# Patient Record
Sex: FEMALE | Race: WHITE | NOT HISPANIC OR LATINO | Employment: OTHER | URBAN - METROPOLITAN AREA
[De-identification: names, ages, dates, MRNs, and addresses within clinical notes are randomized per-mention and may not be internally consistent; named-entity substitution may affect disease eponyms.]

---

## 2024-11-18 ENCOUNTER — PRE-ADMISSION TESTING (OUTPATIENT)
Dept: PREADMISSION TESTING | Facility: HOSPITAL | Age: 69
End: 2024-11-18
Payer: MEDICARE

## 2024-11-18 VITALS
OXYGEN SATURATION: 99 % | HEIGHT: 68 IN | SYSTOLIC BLOOD PRESSURE: 167 MMHG | DIASTOLIC BLOOD PRESSURE: 77 MMHG | RESPIRATION RATE: 18 BRPM | HEART RATE: 56 BPM | WEIGHT: 152 LBS | TEMPERATURE: 97.9 F | BODY MASS INDEX: 23.04 KG/M2

## 2024-11-18 LAB
ANION GAP SERPL CALCULATED.3IONS-SCNC: 10 MMOL/L (ref 5–15)
BUN SERPL-MCNC: 7 MG/DL (ref 8–23)
BUN/CREAT SERPL: 9.9 (ref 7–25)
CALCIUM SPEC-SCNC: 9.3 MG/DL (ref 8.6–10.5)
CHLORIDE SERPL-SCNC: 101 MMOL/L (ref 98–107)
CO2 SERPL-SCNC: 28 MMOL/L (ref 22–29)
CREAT SERPL-MCNC: 0.71 MG/DL (ref 0.57–1)
DEPRECATED RDW RBC AUTO: 43.1 FL (ref 37–54)
EGFRCR SERPLBLD CKD-EPI 2021: 91.6 ML/MIN/1.73
ERYTHROCYTE [DISTWIDTH] IN BLOOD BY AUTOMATED COUNT: 11.8 % (ref 12.3–15.4)
GLUCOSE SERPL-MCNC: 97 MG/DL (ref 65–99)
HCT VFR BLD AUTO: 34.2 % (ref 34–46.6)
HGB BLD-MCNC: 11.6 G/DL (ref 12–15.9)
MCH RBC QN AUTO: 33.9 PG (ref 26.6–33)
MCHC RBC AUTO-ENTMCNC: 33.9 G/DL (ref 31.5–35.7)
MCV RBC AUTO: 100 FL (ref 79–97)
PLATELET # BLD AUTO: 258 10*3/MM3 (ref 140–450)
PMV BLD AUTO: 10.3 FL (ref 6–12)
POTASSIUM SERPL-SCNC: 3.4 MMOL/L (ref 3.5–5.2)
QT INTERVAL: 450 MS
QTC INTERVAL: 446 MS
RBC # BLD AUTO: 3.42 10*6/MM3 (ref 3.77–5.28)
SODIUM SERPL-SCNC: 139 MMOL/L (ref 136–145)
WBC NRBC COR # BLD AUTO: 5.03 10*3/MM3 (ref 3.4–10.8)

## 2024-11-18 PROCEDURE — 36415 COLL VENOUS BLD VENIPUNCTURE: CPT

## 2024-11-18 PROCEDURE — 93005 ELECTROCARDIOGRAM TRACING: CPT

## 2024-11-18 PROCEDURE — 80048 BASIC METABOLIC PNL TOTAL CA: CPT

## 2024-11-18 PROCEDURE — 85027 COMPLETE CBC AUTOMATED: CPT

## 2024-11-18 RX ORDER — SCOLOPAMINE TRANSDERMAL SYSTEM 1 MG/1
1 PATCH, EXTENDED RELEASE TRANSDERMAL AS NEEDED
COMMUNITY
Start: 2024-11-05 | End: 2024-11-18

## 2024-11-18 RX ORDER — MIRABEGRON 50 MG/1
50 TABLET, FILM COATED, EXTENDED RELEASE ORAL DAILY
COMMUNITY

## 2024-11-18 RX ORDER — PANTOPRAZOLE SODIUM 40 MG/1
40 TABLET, DELAYED RELEASE ORAL DAILY
COMMUNITY
Start: 2024-10-28 | End: 2025-04-27

## 2024-11-18 RX ORDER — FERROUS SULFATE 325(65) MG
325 TABLET ORAL DAILY
COMMUNITY
Start: 2024-09-06 | End: 2024-12-06

## 2024-11-18 RX ORDER — ACETAMINOPHEN 325 MG/1
650 TABLET ORAL EVERY 6 HOURS PRN
COMMUNITY

## 2024-11-18 RX ORDER — AMOXICILLIN 500 MG/1
500 CAPSULE ORAL AS NEEDED
COMMUNITY
Start: 2024-10-02

## 2024-11-18 RX ORDER — BUSPIRONE HYDROCHLORIDE 5 MG/1
5 TABLET ORAL 2 TIMES DAILY
COMMUNITY
Start: 2024-10-09 | End: 2025-01-08

## 2024-11-18 RX ORDER — IBUPROFEN 200 MG
200 TABLET ORAL EVERY 6 HOURS PRN
COMMUNITY

## 2024-11-18 RX ORDER — PROPYLENE GLYCOL 0.06 MG/ML
1 SOLUTION/ DROPS OPHTHALMIC DAILY
COMMUNITY

## 2024-11-18 RX ORDER — LORATADINE 10 MG/1
10 TABLET ORAL DAILY
COMMUNITY

## 2024-11-18 RX ORDER — ESTRADIOL 0.1 MG/G
0.5 CREAM VAGINAL 3 TIMES WEEKLY
COMMUNITY
Start: 2024-09-30

## 2024-11-18 NOTE — DISCHARGE INSTRUCTIONS
Take the following medications the morning of surgery:  PANTOPRAZOLE, BUSPIRONE    If you are on prescription narcotic pain medication to control your pain you may also take that medication the morning of surgery.      General Instructions:     Do not eat solid food after midnight the night before surgery.  Clear liquids day of surgery are allowed but must be stopped at least two hours before your hospital arrival time.       Allowed clear liquids      Water, sodas, and tea or coffee with no cream or milk added.       12 to 20 ounces of a clear liquid that contains carbohydrates is recommended.  If non-diabetic, have Gatorade or Powerade.  If diabetic, have G2 or Powerade Zero.     Do not have liquids red in color.  Do not consume chicken, beef, pork or vegetable broth or bouillon cubes of any variety as they are not considered clear liquids and are not allowed.      Infants may have breast milk up to four hours before surgery.  Infants drinking formula may drink formula up to six hours before surgery.   Patients who avoid smoking, chewing tobacco and alcohol for 4 weeks prior to surgery have a reduced risk of post-operative complications.  Quit smoking as many days before surgery as you can.  Do not smoke, use chewing tobacco or drink alcohol the day of surgery.   If applicable bring your C-PAP/ BI-PAP machine in with you to preop day of surgery.  Bring any papers given to you in the doctor’s office.  Wear clean comfortable clothes.  Do not wear contact lenses, false eyelashes or make-up.  Bring a case for your glasses.   Bring crutches or walker if applicable.  Remove all piercings.  Leave jewelry and any other valuables at home.  Hair extensions with metal clips must be removed prior to surgery.  The Pre-Admission Testing nurse will instruct you to bring medications if unable to obtain an accurate list in Pre-Admission Testing.        If you were given a blood bank ID arm band remember to bring it with you the  day of surgery.    Preventing a Surgical Site Infection:  For 2 to 3 days before surgery, avoid shaving with a razor because the razor can irritate skin and make it easier to develop an infection.    Any areas of open skin can increase the risk of a post-operative wound infection by allowing bacteria to enter and travel throughout the body.  Notify your surgeon if you have any skin wounds / rashes even if it is not near the expected surgical site.  The area will need assessed to determine if surgery should be delayed until it is healed.  The night prior to surgery shower using a fresh bar of anti-bacterial soap (such as Dial) and clean washcloth.  Sleep in a clean bed with clean clothing.  Do not allow pets to sleep with you.  Shower on the morning of surgery using a fresh bar of anti-bacterial soap (such as Dial) and clean washcloth.  Dry with a clean towel and dress in clean clothing.  Ask your surgeon if you will be receiving antibiotics prior to surgery.  Make sure you, your family, and all healthcare providers clean their hands with soap and water or an alcohol based hand  before caring for you or your wound.    Day of surgery:  Your arrival time is approximately two hours before your scheduled surgery time.  Please note if you have an early arrival time the surgery doors do not open before 5:00 AM.  Upon arrival, a Pre-op nurse and Anesthesiologist will review your health history, obtain vital signs, and answer questions you may have.  The only belongings needed at this time will be a list of your home medications and if applicable your C-PAP/BI-PAP machine.  A Pre-op nurse will start an IV and you may receive medication in preparation for surgery, including something to help you relax.     Please be aware that surgery does come with discomfort.  We want to make every effort to control your discomfort so please discuss any uncontrolled symptoms with your nurse.   Your doctor will most likely have  prescribed pain medications.      If you are going home after surgery you will receive individualized written care instructions before being discharged.  A responsible adult must drive you to and from the hospital on the day of your surgery and ideally stay with you through the night.   .  Discharge prescriptions can be filled by the hospital pharmacy during regular pharmacy hours.  If you are having surgery late in the day/evening your prescription may be e-prescribed to your pharmacy.  Please verify your pharmacy hours or chose a 24 hour pharmacy to avoid not having access to your prescription because your pharmacy has closed for the day.    If you are staying overnight following surgery, you will be transported to your hospital room following the recovery period.  [facility] has all private rooms.    If you have any questions please call Pre-Admission Testing at [phone].  Deductibles and co-payments are collected on the day of service. Please be prepared to pay the required co-pay, deductible or deposit on the day of service as defined by your plan.    Call your surgeon immediately if you experience any of the following symptoms:  Sore Throat  Shortness of Breath or difficulty breathing  Cough  Chills  Body soreness or muscle pain  Headache  Fever  New loss of taste or smell  Do not arrive for your surgery ill.  Your procedure will need to be rescheduled to another time.  You will need to call your physician before the day of surgery to avoid any unnecessary exposure to hospital staff as well as other patients.

## 2024-11-25 NOTE — H&P
"[facility]   HISTORY AND PHYSICAL    Patient Name:Tracie Mcintyre  : 1955  MRN: [id number]  Primary Care Physician: Lorenzo Hurtado PA-C  Date of admission: (Not on file)    Subjective   Subjective     Chief Complaint: here for surgery     History of Present Illness  Tracie Mcintyre is a 69 y.o.  female with Stage 2 utreovaginal prolapse here for surgery.  She is scheduled for a total robotic hysterectomy, BSO  sacrocolpopexy, possible anterior/posterior colporrhaphy,retropubic synthetic midurethral sling, and cystoscopy today at [facility].      Medical history is significant for GERD, HTN, Constipation, Anxiety.   Surgical history is significant for  Section, Knee Surgery.      Changes to health history since last visit: she is reporting conitnued leakage with laugh, cough and sneeze and is interested in proceeding with retropubic sling placement during procedure.       Pap history: approximate date  and was normal  Imaging: \"none\"   EMB: \" n/a\"   Other Clearance: \" n/a\"            Review of Systems   Constitutional:  Negative for chills and fever.   Respiratory:  Negative for cough, chest tightness and shortness of breath.    Genitourinary:  Negative for dysuria.   All other systems reviewed and are negative.        Personal History     Past Medical History:   Diagnosis Date    Anemia     Anxiety     Constipation     GERD (gastroesophageal reflux disease)     Heart murmur     PONV (postoperative nausea and vomiting)     Urine frequency     Uterovaginal prolapse        Past Surgical History:   Procedure Laterality Date     SECTION      LAPAROSCOPIC TUBAL LIGATION      SHOULDER MANIPULATION Left     TOTAL KNEE ARTHROPLASTY Right        Family History: Her family history is not on file.     Social History: She  reports that she has never smoked. She has never used smokeless tobacco. She reports that she does not currently use alcohol. She reports that she does not use drugs.    Home " Medications:  Mirabegron ER, Propylene Glycol, acetaminophen, amoxicillin, busPIRone, estradiol, ferrous sulfate, ibuprofen, loratadine, and pantoprazole    Allergies:  She has No Known Allergies.    Objective    Objective     Vitals:         Physical Exam     General:  Patient is a healthy appearing female who is well developed, well nourished in no acute distress.    Eyes:  Pupils equal, round. Sclera anicteric.   HEENT:  Normocephalic, atraumatic.    Chest:  Lungs clear to auscultation bilaterally. Normal respiratory effort.   Heart: Regular rate and rhythm. Normal peripheral vascular exam.   Abdomen:  Soft and non-tender, no masses, no hernia.   Skin: No rashes or lesions.   Musculoskeletal:  Normal strength. No edema.   Neurologic:  Alert, nonfocal.   Psychiatric:  Normal affect and mood, oriented x 3.      Genitourinary Exam:    External Genitalia:  Normal appearance, no lesions.     Urethral Meatus: normal size and location, no lesions or prolapse.   Urethra:  No masses or tenderness.   Bladder:  No masses or tenderness.   Vagina: bilateral upper vaginal erosions and granulation tissue bleeding after pessary removal.   Cervix: small, atrophic cervix flush with the upper vagina, pinpoint os.  Uterus: mobile, nontender, very small .   Adnexa/Parametrial:  No palpable masses, no tenderness.    Perineal Sensation: normal.   Anal [place]: absent.         POPQ Examination: (10/21/2024)      Aa  -2     Ba  -2    C  -4  ------------------------------------   GH  3.5    PB  4.5    TVL 8   ------------------------------------   Ap  -1     Bp  -1    D  -2      POP-Q Stage 2     RANDY was demonstrated 20 minutes after spontaneous void with cough/valsalva      Uroflow  Voided Volume: 500 ml   PVR: 111 ml by bladder scan         Assessment & Plan   Assessment / Plan     ASSESSMENT:   Tracie Mcintyre is a 69 y.o.  female with POP-Q Stage 2 uterovaginal prolapse and stress predominant COLE.      PLAN:   Patient scheduled  for total robotic hysterectomy, bilateral salpingoophorectomy, sacrocolpopexy, possible anterior colporrhaphy, possible posterior colporrhaphy, retropubic synthetic midurethral sling, and cystoscopy.   -Today she was allowed the opportunity to ask and discuss all of her questions and concerns related to before, during, and after surgery.  -Post operative expectations and restrictions reviewed in detail. Handout provided to patient to take home for reference.  -We discussed surgical risks, which include, but are not limited to, pain, bleeding, infection, injury to nearby organs including bowel, bladder, ureters, urethra, nerves and blood vessels, as well as the possibility of persistent or recurrent symptoms which may require future surgery.   -We discussed additional surgical risks of possible large abdominal incision, prolonged hospitalization, prolonged catheterization, deep vein thrombosis, and death.  -We further discussed the risk of postoperative dyspareunia or pelvic pain, and the potential for postoperative voiding dysfunction. Specifically, we discussed the risk of persistent or recurrent postoperative incontinence, or alternately postoperative urinary retention, either of which may require future surgical correction. Some pelvic surgeries have a risk of severe postoperative pelvic pain that can last several weeks for which may require removal of the suture or undoing of the surgery.   -Discussed the possibility of need for quinonez catheter post operatively if the patient is unable to void completely after surgery.   -If post operative voiding dysfunction after sling does not improve she may need urethrolysis or possible take down of the sling.  -With vaginal surgery scarring, narrowing, or shortening of the vagina may occur.  -With respect to permanent polypropylene mesh placement, we reviewed the recent FDA communications and the ~4% risk of a mesh-related complication related to this procedure, which could  include mesh erosion into the vagina, bladder, urethra, ureters, or bowel, or alternately pelvic pain or dyspareunia, which may require future surgery. Surgery for any complications may or may not correct the complication. The patient was amenable to these risks, and wishes to proceed.   -Patient has been given a copy of mesh information from the .  -Bowel function is unpredictable postoperatively; surgery may help, not change, or even worsen any present symptoms. In the setting of prior constipation, we recommend starting a bowel regimen prior to surgery.  -FPMRS post operative instructions/hand out provided to patient.  -Consents were signed. She is to meet with PAT for anesthesia counseling and consent.       Mixed Urinary incontinence  - Pt reporting stress urinary incontinence and we discussed worsening RANDY post operatively.   - Data shows 25% risk of RANDY after sacrocolpopexy procedure   - Due to increased risk of worsening RANDY after procedure and inability to present to office for UDS prior to procedure, would recommend retropubic sling placement at time of procedure.       Pre-Op Abx: Ancef 2g  VTE ppx: SCDs  Disposition: Discharge home following surgery, voiding trial in PACU         Kaylah Chung MD  Attending: Dr. Cristopher Chavez MD   I have reviewed the attached history and physical and there are no updates or changes to history and or physical.

## 2024-11-25 NOTE — DISCHARGE INSTRUCTIONS
Female Pelvic Medicine & Reconstructive Surgery (Urogynecology)     POST OPERATIVE INSTRUCTIONS & RECOVERY    You may have had reconstructive pelvic surgery and it is now your turn to play an important role in its success. We know that it will take about 12 weeks for the tissues that have been operated on to heal to 80% of their final strength. Then it may take months or even up to two years for this healing to be complete. Major risk factors for tearing down the surgical repair in the first weeks or months are constipation and heavy lifting. Our recommendations below are to try to prevent this from happening.  The staff at [facility] is committed to ensuring that your post-operative experience is as comfortable as possible. Please do not hesitate to call the office for any questions after recovery. Any questions regarding your surgery or post-operative recovery should be directed to the staff at [facility] rather than your Primary Care Physician (PCP). The following information will help answer the frequently asked questions and will help you understand some of the common experiences that may occur after your surgery.    What should I expect immediately after surgery?    Activity  Take it easy for the first few weeks after surgery; you may need assistance the first few days. Some people notice slight depression after surgery. This will resolve on its own, but please call us if it does not.  You may walk as much as you would like and go up and down stairs, although in the beginning you may need to take one step at a time. Avoid jogging, aerobics, swimming, and biking. Avoid heavy pushing or pulling, including vacuuming and lifting pets/children/grandchildren.  You can return to work 4 to 6 weeks after surgery depending on your requirements for lifting and standing.   Heavy lifting is a major risk factor for tearing down your repair. You should not lift anything heavier than a gallon of milk (8 to 9  pounds) for the first six weeks. You may gradually increase your lifting to 20 pounds six to twelve weeks after surgery.  You may drive your car as soon as you feel ready and can safely react/turn your body, but not while you are taking narcotic medication.    Nutrition  Resume your normal diet after surgery. Eat a well-balanced diet. Drink six to eight glasses of non-caffeinated beverages daily. Water is especially recommended.   Resume all of your prior medications once you are home unless otherwise instructed by your doctor.     Abdominal incision care  If you have an abdominal incision, clean the incision with water. Incisions for laparoscopy are usually ¼ to ½ inch in size. You may have 2 to 3 small incisions with one larger ¾ inch incision OR you may only have 3 to 4 small incisions.   The incisions will have absorbent sutures, steri-strips, band-aids and/or skin glue. The band-aids may be removed the day after surgery. The steri-strips can be kept on until they curl at the edges. The sutures will absorb by themselves. If your skin is closed with staples they must be removed 7-10 days after surgery. Call the office to schedule an appointment for this procedure.    Hygiene, Abdominal incision care, and Vaginal discharge  Shower as usual. If you have an abdominal incision, clean the incision with water. Do not bathe in the bathtub until you have been given permission, usually after you see your surgeon at your 6-week post-op appointment. Keep the incision area clean and dry. Do not use a dressing unless your incision is draining or irritated.  You may notice an abnormal discharge or drainage. It is normal for discharge to go from red to pink to yellow as your sutures dissolve. If the discharge is foul-smelling please call our office.  Keep the perineal area (between the vagina and the rectum) clean and dry. Clean after every bowel movement and each time you urinate. Rinsing with plain water or a sitz-bath may be  helpful.  Use maxi pads - not tampons - to absorb blood or discharge. Avoid douching. You may notice vaginal spotting for up to 6 weeks. If you have bleeding that is heavy enough to soak through two maxi pads for two hours in a row call our office. Keep track of when the bleeding began and how many pads you have used.    Bowel Movements  Constipation can cause severe pain that can get worse with increased amounts of medication. Narcotics can make you constipated. Although we recognize that, depending on the extent of your surgery you may need to use narcotics, please be aware that they can cause constipation.  Constipation is also a major risk factor in tearing down of the surgical repair. We want to avoid constipation. Preventing constipation is much easier than treating it once it happens.  Do not strain during bowel movements.  If you experience constipation, make sure you are drinking plenty of water and eating a high fiber diet. A high fiber diet includes fruits, vegetables, and bran. Your doctor may have also recommended a fiber supplement like Benefiber, Metamucil, Citrucel, or Fibercon. Take this as directed once you are home from the hospital.  If you commonly experience constipation,  over-the-counter MiraLAX (you can use a generic version of polyethylene glycol as well). Please take this daily. If your stool gets too loose while using MiraLAX you can decrease using the medication every other day or every third day. Adjust the MiraLAX dose according to what your need for a regular bowel movement without straining.  If you start to feel constipated you can take MiraLAX once in the morning and once at night until you have your first bowel movement. Alternatively, you may also use a mild laxative such as Milk of Magnesia or a stool softener such as Colace®. In addition, make sure that you are getting enough activity during the day. No prescription is required for these medications.     Voiding /  Urination  Empty your bladder every two to three hours while you are awake, whether you feel like you need to or not. Sometimes surgery can change your sensation of fullness and you may not feel like you need to void. We want to avoid your bladder becoming too full.  If you have any special bladder instructions, you will be given them in the hospital. If you are unsure about these instructions call the office.  Call the office if you begin to experience any bladder problems. These problems may include urgency, frequency or pain with urination.     Sexual Juda  You should not have intercourse for a minimum of 6 weeks after surgery. Your doctor will tell you when you can resume sexual intercourse at your post-op visit.  You should not put anything in the vagina for six weeks after surgery. The only exceptions are if your doctor told you to use vaginal estrogen cream or pills. If you are using vaginal estrogen cream or pills, you can resume using those when you get home.    Fever  If you feel feverish or having shaking chills, take your temperature.  If your temperature is 100.4°F twice in a row, or if your temperature is 101°F or higher one time, call our office. This may mean that you have an infection and we will want to know about that.    Pain control  Most women will require pain medication after surgery. If you are comfortable you will be a little more active, which will help recovery.  Follow your doctor's instructions regarding pain medication. Because the narcotics can cause constipation, we encourage you to take over the counter medications like Tylenol (generic name: acetaminophen) or ibuprofen when you are able. If you are unsure if you are able to take these medications then ask your doctor about using them before your use it. Make sure that you do not take more than the recommended daily limit.   You may use a heating pad on your abdomen to relieve gas pain.      Please notify our office if any of  the following occurs:  Increased pain, redness, discharge, hardness, or swelling at the incision.  Pain or burning with urination  Vaginal bleeding that soaks more than 1 maxi pad per hour for two hours in a row  Fever greater than 101°F   Persistent nausea, vomiting or inability to have bowel movements.  Shortness of breath, calf pain or swelling in your extremities    Please call the office to schedule your post-operative visit for about 6 weeks after your surgery. You may also see your doctor again about 3 to 6 months and/or 1 year after your surgery. Additional appointments can and will be made based upon need.     **If you leave the hospital with a bladder catheter in place call the office to schedule an appointment to take place 1 week after your surgery **    Please call with any questions or concerns.   [phone] Monday to Friday 8AM to 5PM  or [phone] after work hours and during holidays    Dr. Cristopher Tubbs

## 2024-11-26 ENCOUNTER — ANESTHESIA EVENT (OUTPATIENT)
Dept: PERIOP | Facility: HOSPITAL | Age: 69
End: 2024-11-26
Payer: MEDICARE

## 2024-11-26 ENCOUNTER — HOSPITAL ENCOUNTER (OUTPATIENT)
Facility: HOSPITAL | Age: 69
Discharge: HOME | End: 2024-11-27
Attending: OBSTETRICS & GYNECOLOGY | Admitting: OBSTETRICS & GYNECOLOGY
Payer: MEDICARE

## 2024-11-26 ENCOUNTER — ANESTHESIA (OUTPATIENT)
Dept: PERIOP | Facility: HOSPITAL | Age: 69
End: 2024-11-26
Payer: MEDICARE

## 2024-11-26 DIAGNOSIS — G89.18 ACUTE POSTOPERATIVE PAIN: Primary | ICD-10-CM

## 2024-11-26 DIAGNOSIS — N81.4 UTEROVAGINAL PROLAPSE: ICD-10-CM

## 2024-11-26 LAB
ABO GROUP BLD: NORMAL
BLD GP AB SCN SERPL QL: NEGATIVE
RH BLD: POSITIVE
T&S EXPIRATION DATE: NORMAL

## 2024-11-26 PROCEDURE — 25010000002 DEXAMETHASONE SODIUM PHOSPHATE 20 MG/5ML SOLUTION

## 2024-11-26 PROCEDURE — 25810000003 LACTATED RINGERS PER 1000 ML: Performed by: STUDENT IN AN ORGANIZED HEALTH CARE EDUCATION/TRAINING PROGRAM

## 2024-11-26 PROCEDURE — 25010000002 LIDOCAINE 2% SOLUTION

## 2024-11-26 PROCEDURE — 25010000002 ACETAMINOPHEN 10 MG/ML SOLUTION

## 2024-11-26 PROCEDURE — 25010000002 PROPOFOL 10 MG/ML EMULSION

## 2024-11-26 PROCEDURE — 25010000002 CEFAZOLIN PER 500 MG: Performed by: STUDENT IN AN ORGANIZED HEALTH CARE EDUCATION/TRAINING PROGRAM

## 2024-11-26 PROCEDURE — 25810000003 SODIUM CHLORIDE PER 500 ML: Performed by: OBSTETRICS & GYNECOLOGY

## 2024-11-26 PROCEDURE — 88305 TISSUE EXAM BY PATHOLOGIST: CPT | Performed by: OBSTETRICS & GYNECOLOGY

## 2024-11-26 PROCEDURE — 86901 BLOOD TYPING SEROLOGIC RH(D): CPT | Performed by: STUDENT IN AN ORGANIZED HEALTH CARE EDUCATION/TRAINING PROGRAM

## 2024-11-26 PROCEDURE — 25010000002 SUGAMMADEX 200 MG/2ML SOLUTION

## 2024-11-26 PROCEDURE — 86900 BLOOD TYPING SEROLOGIC ABO: CPT | Performed by: STUDENT IN AN ORGANIZED HEALTH CARE EDUCATION/TRAINING PROGRAM

## 2024-11-26 PROCEDURE — G0378 HOSPITAL OBSERVATION PER HR: HCPCS

## 2024-11-26 PROCEDURE — 25010000002 ONDANSETRON PER 1 MG

## 2024-11-26 PROCEDURE — 63710000001 BUSPIRONE 5 MG TABLET: Performed by: STUDENT IN AN ORGANIZED HEALTH CARE EDUCATION/TRAINING PROGRAM

## 2024-11-26 PROCEDURE — 25010000002 KETOROLAC TROMETHAMINE PER 15 MG

## 2024-11-26 PROCEDURE — C1763 CONN TISS, NON-HUMAN: HCPCS | Performed by: OBSTETRICS & GYNECOLOGY

## 2024-11-26 PROCEDURE — 63710000001 ACETAMINOPHEN EXTRA STRENGTH 500 MG TABLET: Performed by: STUDENT IN AN ORGANIZED HEALTH CARE EDUCATION/TRAINING PROGRAM

## 2024-11-26 PROCEDURE — 25010000002 GLYCOPYRROLATE 0.2 MG/ML SOLUTION

## 2024-11-26 PROCEDURE — 86850 RBC ANTIBODY SCREEN: CPT | Performed by: STUDENT IN AN ORGANIZED HEALTH CARE EDUCATION/TRAINING PROGRAM

## 2024-11-26 PROCEDURE — 25010000002 KETOROLAC TROMETHAMINE PER 15 MG: Performed by: STUDENT IN AN ORGANIZED HEALTH CARE EDUCATION/TRAINING PROGRAM

## 2024-11-26 PROCEDURE — 25010000002 HYDROMORPHONE PER 4 MG: Performed by: NURSE ANESTHETIST, CERTIFIED REGISTERED

## 2024-11-26 PROCEDURE — 25010000002 MAGNESIUM SULFATE PER 500 MG OF MAGNESIUM

## 2024-11-26 PROCEDURE — 25010000002 HYDROMORPHONE 1 MG/ML SOLUTION

## 2024-11-26 PROCEDURE — 25010000002 CEFAZOLIN PER 500 MG

## 2024-11-26 PROCEDURE — 25010000002 FENTANYL CITRATE (PF) 50 MCG/ML SOLUTION

## 2024-11-26 PROCEDURE — A9270 NON-COVERED ITEM OR SERVICE: HCPCS | Performed by: STUDENT IN AN ORGANIZED HEALTH CARE EDUCATION/TRAINING PROGRAM

## 2024-11-26 PROCEDURE — C1771 REP DEV, URINARY, W/SLING: HCPCS | Performed by: OBSTETRICS & GYNECOLOGY

## 2024-11-26 DEVICE — KNOTLESS TISSUE CONTROL DEVICE, UNDYED UNIDIRECTIONAL (ANTIBACTERIAL) SYNTHETIC ABSORBABLE DEVICE
Type: IMPLANTABLE DEVICE | Site: VAGINA | Status: FUNCTIONAL
Brand: STRATAFIX

## 2024-11-26 DEVICE — TRANSVAGINAL MID-URETHRAL SLING
Type: IMPLANTABLE DEVICE | Site: VAGINA | Status: FUNCTIONAL
Brand: ADVANTAGE FIT™  SYSTEM

## 2024-11-26 DEVICE — TRADITIONAL Y MESH
Type: IMPLANTABLE DEVICE | Site: VAGINA | Status: FUNCTIONAL
Brand: UPSYLON™

## 2024-11-26 DEVICE — KNOTLESS TISSUE CONTROL DEVICE, VIOLET UNIDIRECTIONAL (ANTIBACTERIAL) SYNTHETIC ABSORBABLE DEVICE
Type: IMPLANTABLE DEVICE | Site: VAGINA | Status: FUNCTIONAL
Brand: STRATAFIX

## 2024-11-26 RX ORDER — OXYCODONE HYDROCHLORIDE 5 MG/1
5 TABLET ORAL EVERY 4 HOURS PRN
Status: DISCONTINUED | OUTPATIENT
Start: 2024-11-26 | End: 2024-11-27 | Stop reason: HOSPADM

## 2024-11-26 RX ORDER — LABETALOL HYDROCHLORIDE 5 MG/ML
5 INJECTION, SOLUTION INTRAVENOUS
Status: DISCONTINUED | OUTPATIENT
Start: 2024-11-26 | End: 2024-11-26 | Stop reason: HOSPADM

## 2024-11-26 RX ORDER — SCOLOPAMINE TRANSDERMAL SYSTEM 1 MG/1
1 PATCH, EXTENDED RELEASE TRANSDERMAL CONTINUOUS
Status: DISCONTINUED | OUTPATIENT
Start: 2024-11-26 | End: 2024-11-27 | Stop reason: HOSPADM

## 2024-11-26 RX ORDER — HYDROMORPHONE HYDROCHLORIDE 1 MG/ML
0.5 INJECTION, SOLUTION INTRAMUSCULAR; INTRAVENOUS; SUBCUTANEOUS
Status: DISCONTINUED | OUTPATIENT
Start: 2024-11-26 | End: 2024-11-26 | Stop reason: HOSPADM

## 2024-11-26 RX ORDER — DEXTROSE MONOHYDRATE 25 G/50ML
INJECTION, SOLUTION INTRAVENOUS AS NEEDED
Status: DISCONTINUED | OUTPATIENT
Start: 2024-11-26 | End: 2024-11-26 | Stop reason: HOSPADM

## 2024-11-26 RX ORDER — LIDOCAINE HYDROCHLORIDE 10 MG/ML
0.5 INJECTION, SOLUTION INFILTRATION; PERINEURAL ONCE AS NEEDED
Status: DISCONTINUED | OUTPATIENT
Start: 2024-11-26 | End: 2024-11-26 | Stop reason: HOSPADM

## 2024-11-26 RX ORDER — IPRATROPIUM BROMIDE AND ALBUTEROL SULFATE 2.5; .5 MG/3ML; MG/3ML
3 SOLUTION RESPIRATORY (INHALATION) ONCE AS NEEDED
Status: DISCONTINUED | OUTPATIENT
Start: 2024-11-26 | End: 2024-11-26 | Stop reason: HOSPADM

## 2024-11-26 RX ORDER — DROPERIDOL 2.5 MG/ML
0.62 INJECTION, SOLUTION INTRAMUSCULAR; INTRAVENOUS
Status: DISCONTINUED | OUTPATIENT
Start: 2024-11-26 | End: 2024-11-26 | Stop reason: HOSPADM

## 2024-11-26 RX ORDER — ROCURONIUM BROMIDE 10 MG/ML
INJECTION, SOLUTION INTRAVENOUS AS NEEDED
Status: DISCONTINUED | OUTPATIENT
Start: 2024-11-26 | End: 2024-11-26 | Stop reason: SURG

## 2024-11-26 RX ORDER — FENTANYL CITRATE 50 UG/ML
50 INJECTION, SOLUTION INTRAMUSCULAR; INTRAVENOUS
Status: DISCONTINUED | OUTPATIENT
Start: 2024-11-26 | End: 2024-11-26 | Stop reason: HOSPADM

## 2024-11-26 RX ORDER — KETOROLAC TROMETHAMINE 30 MG/ML
INJECTION, SOLUTION INTRAMUSCULAR; INTRAVENOUS AS NEEDED
Status: DISCONTINUED | OUTPATIENT
Start: 2024-11-26 | End: 2024-11-26 | Stop reason: SURG

## 2024-11-26 RX ORDER — SODIUM CHLORIDE, SODIUM LACTATE, POTASSIUM CHLORIDE, CALCIUM CHLORIDE 600; 310; 30; 20 MG/100ML; MG/100ML; MG/100ML; MG/100ML
125 INJECTION, SOLUTION INTRAVENOUS CONTINUOUS
Status: ACTIVE | OUTPATIENT
Start: 2024-11-26 | End: 2024-11-26

## 2024-11-26 RX ORDER — MIDAZOLAM HYDROCHLORIDE 1 MG/ML
0.5 INJECTION, SOLUTION INTRAMUSCULAR; INTRAVENOUS
Status: DISCONTINUED | OUTPATIENT
Start: 2024-11-26 | End: 2024-11-26 | Stop reason: HOSPADM

## 2024-11-26 RX ORDER — DEXAMETHASONE SODIUM PHOSPHATE 4 MG/ML
INJECTION, SOLUTION INTRA-ARTICULAR; INTRALESIONAL; INTRAMUSCULAR; INTRAVENOUS; SOFT TISSUE AS NEEDED
Status: DISCONTINUED | OUTPATIENT
Start: 2024-11-26 | End: 2024-11-26 | Stop reason: SURG

## 2024-11-26 RX ORDER — DIPHENHYDRAMINE HYDROCHLORIDE 50 MG/ML
12.5 INJECTION INTRAMUSCULAR; INTRAVENOUS
Status: DISCONTINUED | OUTPATIENT
Start: 2024-11-26 | End: 2024-11-26 | Stop reason: HOSPADM

## 2024-11-26 RX ORDER — HYDROCODONE BITARTRATE AND ACETAMINOPHEN 5; 325 MG/1; MG/1
1 TABLET ORAL ONCE AS NEEDED
Status: DISCONTINUED | OUTPATIENT
Start: 2024-11-26 | End: 2024-11-26 | Stop reason: HOSPADM

## 2024-11-26 RX ORDER — SODIUM CHLORIDE, SODIUM LACTATE, POTASSIUM CHLORIDE, CALCIUM CHLORIDE 600; 310; 30; 20 MG/100ML; MG/100ML; MG/100ML; MG/100ML
9 INJECTION, SOLUTION INTRAVENOUS CONTINUOUS
Status: ACTIVE | OUTPATIENT
Start: 2024-11-26 | End: 2024-11-27

## 2024-11-26 RX ORDER — ACETAMINOPHEN 500 MG
1000 TABLET ORAL ONCE
Status: COMPLETED | OUTPATIENT
Start: 2024-11-26 | End: 2024-11-26

## 2024-11-26 RX ORDER — PANTOPRAZOLE SODIUM 40 MG/1
40 TABLET, DELAYED RELEASE ORAL
Status: DISCONTINUED | OUTPATIENT
Start: 2024-11-27 | End: 2024-11-27 | Stop reason: HOSPADM

## 2024-11-26 RX ORDER — ONDANSETRON 2 MG/ML
4 INJECTION INTRAMUSCULAR; INTRAVENOUS EVERY 6 HOURS PRN
Status: DISCONTINUED | OUTPATIENT
Start: 2024-11-26 | End: 2024-11-27 | Stop reason: HOSPADM

## 2024-11-26 RX ORDER — SODIUM CHLORIDE 9 MG/ML
40 INJECTION, SOLUTION INTRAVENOUS AS NEEDED
Status: DISCONTINUED | OUTPATIENT
Start: 2024-11-26 | End: 2024-11-26 | Stop reason: HOSPADM

## 2024-11-26 RX ORDER — SODIUM CHLORIDE 0.9 % (FLUSH) 0.9 %
3 SYRINGE (ML) INJECTION EVERY 12 HOURS SCHEDULED
Status: DISCONTINUED | OUTPATIENT
Start: 2024-11-26 | End: 2024-11-26 | Stop reason: HOSPADM

## 2024-11-26 RX ORDER — NALOXONE HCL 0.4 MG/ML
0.2 VIAL (ML) INJECTION AS NEEDED
Status: DISCONTINUED | OUTPATIENT
Start: 2024-11-26 | End: 2024-11-26 | Stop reason: HOSPADM

## 2024-11-26 RX ORDER — SODIUM CHLORIDE 9 MG/ML
INJECTION, SOLUTION INTRAVENOUS AS NEEDED
Status: DISCONTINUED | OUTPATIENT
Start: 2024-11-26 | End: 2024-11-26 | Stop reason: HOSPADM

## 2024-11-26 RX ORDER — FLUMAZENIL 0.1 MG/ML
0.2 INJECTION INTRAVENOUS AS NEEDED
Status: DISCONTINUED | OUTPATIENT
Start: 2024-11-26 | End: 2024-11-26 | Stop reason: HOSPADM

## 2024-11-26 RX ORDER — ONDANSETRON 2 MG/ML
4 INJECTION INTRAMUSCULAR; INTRAVENOUS ONCE AS NEEDED
Status: DISCONTINUED | OUTPATIENT
Start: 2024-11-26 | End: 2024-11-26 | Stop reason: HOSPADM

## 2024-11-26 RX ORDER — MAGNESIUM SULFATE HEPTAHYDRATE 500 MG/ML
INJECTION, SOLUTION INTRAMUSCULAR; INTRAVENOUS AS NEEDED
Status: DISCONTINUED | OUTPATIENT
Start: 2024-11-26 | End: 2024-11-26 | Stop reason: SURG

## 2024-11-26 RX ORDER — BUSPIRONE HYDROCHLORIDE 5 MG/1
5 TABLET ORAL EVERY 12 HOURS SCHEDULED
Status: DISCONTINUED | OUTPATIENT
Start: 2024-11-26 | End: 2024-11-27 | Stop reason: HOSPADM

## 2024-11-26 RX ORDER — SODIUM CHLORIDE 0.9 % (FLUSH) 0.9 %
10 SYRINGE (ML) INJECTION AS NEEDED
Status: DISCONTINUED | OUTPATIENT
Start: 2024-11-26 | End: 2024-11-26 | Stop reason: HOSPADM

## 2024-11-26 RX ORDER — FENTANYL CITRATE 50 UG/ML
INJECTION, SOLUTION INTRAMUSCULAR; INTRAVENOUS AS NEEDED
Status: DISCONTINUED | OUTPATIENT
Start: 2024-11-26 | End: 2024-11-26 | Stop reason: SURG

## 2024-11-26 RX ORDER — CEFAZOLIN SODIUM 500 MG/2.2ML
INJECTION, POWDER, FOR SOLUTION INTRAMUSCULAR; INTRAVENOUS AS NEEDED
Status: DISCONTINUED | OUTPATIENT
Start: 2024-11-26 | End: 2024-11-26 | Stop reason: SURG

## 2024-11-26 RX ORDER — EPHEDRINE SULFATE 50 MG/ML
5 INJECTION, SOLUTION INTRAVENOUS ONCE AS NEEDED
Status: DISCONTINUED | OUTPATIENT
Start: 2024-11-26 | End: 2024-11-26 | Stop reason: HOSPADM

## 2024-11-26 RX ORDER — PROMETHAZINE HYDROCHLORIDE 25 MG/1
25 SUPPOSITORY RECTAL ONCE AS NEEDED
Status: DISCONTINUED | OUTPATIENT
Start: 2024-11-26 | End: 2024-11-26 | Stop reason: HOSPADM

## 2024-11-26 RX ORDER — OXYCODONE AND ACETAMINOPHEN 7.5; 325 MG/1; MG/1
1 TABLET ORAL EVERY 4 HOURS PRN
Status: DISCONTINUED | OUTPATIENT
Start: 2024-11-26 | End: 2024-11-26 | Stop reason: HOSPADM

## 2024-11-26 RX ORDER — SODIUM CHLORIDE 0.9 % (FLUSH) 0.9 %
10 SYRINGE (ML) INJECTION EVERY 12 HOURS SCHEDULED
Status: DISCONTINUED | OUTPATIENT
Start: 2024-11-26 | End: 2024-11-26 | Stop reason: HOSPADM

## 2024-11-26 RX ORDER — HYDRALAZINE HYDROCHLORIDE 20 MG/ML
5 INJECTION INTRAMUSCULAR; INTRAVENOUS
Status: DISCONTINUED | OUTPATIENT
Start: 2024-11-26 | End: 2024-11-26 | Stop reason: HOSPADM

## 2024-11-26 RX ORDER — LIDOCAINE HYDROCHLORIDE 20 MG/ML
INJECTION, SOLUTION INFILTRATION; PERINEURAL AS NEEDED
Status: DISCONTINUED | OUTPATIENT
Start: 2024-11-26 | End: 2024-11-26 | Stop reason: SURG

## 2024-11-26 RX ORDER — PROMETHAZINE HYDROCHLORIDE 25 MG/1
25 TABLET ORAL ONCE AS NEEDED
Status: DISCONTINUED | OUTPATIENT
Start: 2024-11-26 | End: 2024-11-26 | Stop reason: HOSPADM

## 2024-11-26 RX ORDER — FAMOTIDINE 10 MG/ML
20 INJECTION, SOLUTION INTRAVENOUS ONCE
Status: COMPLETED | OUTPATIENT
Start: 2024-11-26 | End: 2024-11-26

## 2024-11-26 RX ORDER — KETOROLAC TROMETHAMINE 15 MG/ML
15 INJECTION, SOLUTION INTRAMUSCULAR; INTRAVENOUS EVERY 6 HOURS
Status: DISCONTINUED | OUTPATIENT
Start: 2024-11-26 | End: 2024-11-27 | Stop reason: HOSPADM

## 2024-11-26 RX ORDER — ONDANSETRON 4 MG/1
4 TABLET, ORALLY DISINTEGRATING ORAL EVERY 6 HOURS PRN
Status: DISCONTINUED | OUTPATIENT
Start: 2024-11-26 | End: 2024-11-27 | Stop reason: HOSPADM

## 2024-11-26 RX ORDER — ACETAMINOPHEN 500 MG
1000 TABLET ORAL EVERY 8 HOURS
Status: DISCONTINUED | OUTPATIENT
Start: 2024-11-26 | End: 2024-11-27 | Stop reason: HOSPADM

## 2024-11-26 RX ORDER — PROPOFOL 10 MG/ML
VIAL (ML) INTRAVENOUS AS NEEDED
Status: DISCONTINUED | OUTPATIENT
Start: 2024-11-26 | End: 2024-11-26 | Stop reason: SURG

## 2024-11-26 RX ORDER — GLYCOPYRROLATE 0.2 MG/ML
INJECTION INTRAMUSCULAR; INTRAVENOUS AS NEEDED
Status: DISCONTINUED | OUTPATIENT
Start: 2024-11-26 | End: 2024-11-26 | Stop reason: SURG

## 2024-11-26 RX ORDER — ONDANSETRON 2 MG/ML
INJECTION INTRAMUSCULAR; INTRAVENOUS AS NEEDED
Status: DISCONTINUED | OUTPATIENT
Start: 2024-11-26 | End: 2024-11-26 | Stop reason: SURG

## 2024-11-26 RX ORDER — SODIUM CHLORIDE 0.9 % (FLUSH) 0.9 %
3-10 SYRINGE (ML) INJECTION AS NEEDED
Status: DISCONTINUED | OUTPATIENT
Start: 2024-11-26 | End: 2024-11-26 | Stop reason: HOSPADM

## 2024-11-26 RX ORDER — OXYCODONE HYDROCHLORIDE 5 MG/1
5 TABLET ORAL EVERY 6 HOURS PRN
Qty: 12 TABLET | Refills: 0 | Status: SHIPPED | OUTPATIENT
Start: 2024-11-26

## 2024-11-26 RX ORDER — ACETAMINOPHEN 10 MG/ML
INJECTION, SOLUTION INTRAVENOUS AS NEEDED
Status: DISCONTINUED | OUTPATIENT
Start: 2024-11-26 | End: 2024-11-26 | Stop reason: SURG

## 2024-11-26 RX ADMIN — ROCURONIUM BROMIDE 20 MG: 10 INJECTION, SOLUTION INTRAVENOUS at 13:33

## 2024-11-26 RX ADMIN — HYDROMORPHONE HYDROCHLORIDE 0.5 MG: 0.5 INJECTION, SOLUTION INTRAMUSCULAR; INTRAVENOUS; SUBCUTANEOUS at 20:11

## 2024-11-26 RX ADMIN — SODIUM CHLORIDE 2000 MG: 900 INJECTION INTRAVENOUS at 10:41

## 2024-11-26 RX ADMIN — FENTANYL CITRATE 50 MCG: 50 INJECTION, SOLUTION INTRAMUSCULAR; INTRAVENOUS at 12:49

## 2024-11-26 RX ADMIN — KETOROLAC TROMETHAMINE 30 MG: 30 INJECTION, SOLUTION INTRAMUSCULAR at 16:16

## 2024-11-26 RX ADMIN — ROCURONIUM BROMIDE 50 MG: 10 INJECTION, SOLUTION INTRAVENOUS at 10:55

## 2024-11-26 RX ADMIN — FENTANYL CITRATE 50 MCG: 50 INJECTION, SOLUTION INTRAMUSCULAR; INTRAVENOUS at 11:27

## 2024-11-26 RX ADMIN — KETOROLAC TROMETHAMINE 15 MG: 15 INJECTION, SOLUTION INTRAMUSCULAR; INTRAVENOUS at 21:35

## 2024-11-26 RX ADMIN — MAGNESIUM SULFATE HEPTAHYDRATE 2 G: 500 INJECTION, SOLUTION INTRAMUSCULAR; INTRAVENOUS at 11:16

## 2024-11-26 RX ADMIN — CEFAZOLIN 2 G: 225 INJECTION, POWDER, FOR SOLUTION INTRAMUSCULAR; INTRAVENOUS at 14:41

## 2024-11-26 RX ADMIN — GLYCOPYRROLATE 0.2 MG: 0.2 INJECTION INTRAMUSCULAR; INTRAVENOUS at 11:52

## 2024-11-26 RX ADMIN — ROCURONIUM BROMIDE 10 MG: 10 INJECTION, SOLUTION INTRAVENOUS at 15:16

## 2024-11-26 RX ADMIN — SODIUM CHLORIDE, SODIUM LACTATE, POTASSIUM CHLORIDE, CALCIUM CHLORIDE 9 ML/HR: 20; 30; 600; 310 INJECTION, SOLUTION INTRAVENOUS at 09:46

## 2024-11-26 RX ADMIN — BUSPIRONE HYDROCHLORIDE 5 MG: 5 TABLET ORAL at 21:34

## 2024-11-26 RX ADMIN — FAMOTIDINE 20 MG: 10 INJECTION INTRAVENOUS at 09:47

## 2024-11-26 RX ADMIN — ROCURONIUM BROMIDE 10 MG: 10 INJECTION, SOLUTION INTRAVENOUS at 14:30

## 2024-11-26 RX ADMIN — ROCURONIUM BROMIDE 20 MG: 10 INJECTION, SOLUTION INTRAVENOUS at 11:20

## 2024-11-26 RX ADMIN — SCOPOLAMINE 1 PATCH: 1.5 PATCH, EXTENDED RELEASE TRANSDERMAL at 09:47

## 2024-11-26 RX ADMIN — ACETAMINOPHEN 1000 MG: 500 TABLET ORAL at 23:59

## 2024-11-26 RX ADMIN — SUGAMMADEX 200 MG: 100 INJECTION, SOLUTION INTRAVENOUS at 16:33

## 2024-11-26 RX ADMIN — ONDANSETRON 4 MG: 2 INJECTION INTRAMUSCULAR; INTRAVENOUS at 11:10

## 2024-11-26 RX ADMIN — LIDOCAINE HYDROCHLORIDE 10 MG: 20 INJECTION, SOLUTION INFILTRATION; PERINEURAL at 10:55

## 2024-11-26 RX ADMIN — HYDROMORPHONE HYDROCHLORIDE 0.5 MG: 1 INJECTION, SOLUTION INTRAMUSCULAR; INTRAVENOUS; SUBCUTANEOUS at 11:57

## 2024-11-26 RX ADMIN — DEXAMETHASONE SODIUM PHOSPHATE 4 MG: 4 INJECTION, SOLUTION INTRAMUSCULAR; INTRAVENOUS at 11:10

## 2024-11-26 RX ADMIN — ACETAMINOPHEN 1000 MG: 1000 INJECTION INTRAVENOUS at 15:39

## 2024-11-26 RX ADMIN — PROPOFOL 50 MCG/KG/MIN: 10 INJECTION, EMULSION INTRAVENOUS at 11:09

## 2024-11-26 RX ADMIN — PROPOFOL 200 MG: 10 INJECTION, EMULSION INTRAVENOUS at 10:55

## 2024-11-26 RX ADMIN — ROCURONIUM BROMIDE 10 MG: 10 INJECTION, SOLUTION INTRAVENOUS at 12:16

## 2024-11-26 RX ADMIN — ACETAMINOPHEN 1000 MG: 500 TABLET ORAL at 09:45

## 2024-11-26 NOTE — ANESTHESIA PROCEDURE NOTES
Airway  Urgency: elective    Date/Time: 11/26/2024 11:01 AM  Airway not difficult    General Information and Staff    Patient location during procedure: OR  AGUILAR/CAA: Steven Le CRNA    Indications and Patient Condition  Indications for airway management: airway protection    Preoxygenated: yes  MILS not maintained throughout  Mask difficulty assessment: 1 - vent by mask    Final Airway Details  Final airway type: endotracheal airway      Successful airway: ETT  Cuffed: yes   Successful intubation technique: direct laryngoscopy  Endotracheal tube insertion site: oral  Blade: Meche  Blade size: 3  ETT size (mm): 7.0  Cormack-Lehane Classification: grade I - full view of glottis  Placement verified by: chest auscultation and capnometry   Cuff volume (mL): 9  Measured from: teeth  ETT/EBT  to teeth (cm): 22  Number of attempts at approach: 2  Assessment: lips, teeth, and gum same as pre-op and atraumatic intubation    Additional Comments  First look by medical student. No Attempt at intubation made. Second look by CRNA, intubation successful, atraumatic, patient tolerated well. During both attempts patients vitals remained stable.

## 2024-11-26 NOTE — OP NOTE
OPERATIVE REPORT     Surgery Date:  11/26/2024    Preoperative Diagnosis:   Symptomatic stage 2 (posterior predominant) uterovaginal prolapse   Stress urinary incontinence    Postoperative Diagnosis:   Same as above     Surgery:   Total robotic hysterectomy   Bilateral salpingo-oophorectomy   Robotic sacrocolpopexy with Upsylon-Y polypropylene mesh   Retropubic synthetic midurethral sling (Advantage Fit)   Cystourethroscopy (multiple)   Posterior colporrhaphy  Perineorrhaphy     Attending:  Dr. Cristopher Chavez MD  Fellow: Dr. Kaylah Chung MD   Resident: Dr. Amy Killian MD    Anesthesia: General endotracheal     Antibiotics: 2g Ancef (redosed at 4 hours)     EBL: 50  mL    Urine output:  700 mL    Complications:  none    Implants:   Upsylon-Y Mesh ([employer])  Advantage Fit retropubic sling ([employer])     Specimens:  Uterus, cervix, bilateral fallopian tubes, Bilateral ovaries     Intraoperative Findings:    Stage 2 posterior predominant uterovaginal prolapse   Normal uterus, bilateral tubes, and ovaries. All pedicles hemostatic upon completion.   Vaginal cuff adequately imbricated with anterior dissection 5 cm down the vagina and posterior dissection 6 cm down the vagina.   Bladder mucosa without evidence of injury or mesh, brisk bilateral ureteral efflux noted. Multiple shallow diverticula were noted along the base of the bladder without evidence of bladder stones.   TVL 10 cm with apex -10 cm       Description of Procedure:    Patient was seen in the preoperative area. Risks, benefits and alternatives to the surgery were discussed and consents were reviewed.    The patient was brought to the operating room with IV fluids running. General anesthesia was administered. Patient was positioned in dorsal lithotomy position with a pink pad. Her arms were tucked. Testing in 30-degrees of Trendelenberg revealed that she was secure to the operating table. She was then prepared and draped in the  usual sterile fashion. A surgical timeout was performed and all were in agreement. A quinonez catheter was placed.  A right angle retractor was placed into the vagina. The anterior lip of the cervix was grasped with a single-tooth tenaculum and the cervix was sequentially dilated. A 8 cm tip and 3 cm cup for the AGATHA II uterine manipulator was then placed and the tenaculum was removed.     A Veress needle was inserted at Casey's point and low opening pressure was confirmed for intraperitoneal entry. The abdomen was insufflated to 15 mmHg and the Veress was removed. An incision was made just inferior to this point and the 12 mm Optical view port was introduced into the patient's abdomen through casey's point.  The abdomen and pelvis were then inspected as well as the anterior abdominal wall and no adhesions seen.    Next, the umbilicus was grasped with 2 Allis clamps and a vertical incision was made through the umbilicus and carried down with a scalpel. An 8 mm robotic trocar was then introduced under direct visualization. An additional robotic port was placed lateral to the assist port at the level of the umbilicus in the left lower quadrant.   On the right side, a robotic trocar was placed 10 cm lateral to the umbilical trocar and the second robotic trocar was placed 10 cm lateral and 1 cm inferior to the umbilical port. All of the trocars were introduced under direct visualization. The patient was then placed in steep Trendelenburg position, and the robot was docked on the patient's left  The Cadiere forceps were placed in arm 1, camera in arm 2,  the monopolar scissors in arm 3 and the Bipolar forceps in arm 4.      The right and left ureter were identified prior to starting the hysterectomy. The right round ligament was grasped, cauterized and transected with the monopolar scissors. The anterior and posterior leaves of the broad ligament were then opened on the right side. The right uterine vessels were  skeletonized. The bladder flap was developed anteriorly. We then skeletonized the infundibulopelvic ligament on the right. The retroperitoneal space was further dissected to identify the right ureter, which was noted to be well below the infundibulopelvic and utero-ovarian ligaments. We then repeated this on the left side and in a similar fashion, the left round ligament was transected, opened the broad ligament, skeletonized the uterine arteries, and infundibulopelvic ligament, and identified the left ureter.  The monopolar scissors was removed and the robotic vessel sealer was inserted.   At this time the bilateral infundibulopelvic ligaments and bilateral uterine arteries were then coagulated and transected using the vessel sealer. The vessel sealer was removed and monopolar inserted.  The bladder flap was further developed and the anterior vagina was further dissected down to reach the level of the trigone, and the colpotomy was performed using the monopolar scissors. The uterus, cervix and bilateral tubes with ovaries were then delivered into the vagina and handed off the field. An Asepto bulb was placed in the vagina to maintain the pneumoperitoneum. The large needle  was inserted in Arm 1 and the suzette- suture cut in arm 3.  The vaginal cuff was then closed using 0-Stratfix suture in a running non-locking fashion.   The first layer was imbricated using 0-Stratafix suture  Excellent hemostasis was noted at all the pedicles.  The needle drivers were removed and the Cadiere and monopolar scissor reinserted.    At this point in time, we then turned our attention to the sacral promontory. Bowel was moved out of the pelvis, so that the sacral promontory was adequately visualized. The peritoneum over the promontory was then grasped and an incision was made in the peritoneum using the monopolar scissors. The dissection was carried down until the anterior longitudinal ligament was visualized. The peritoneal  incision was then carried down along the right pelvic sidewall staying medial to the ureter and extending over the apex of the vaginal to the left lateral aspect of the vaginal cuff. A Lucite roberto was then placed in the vagina. The bladder was dissected further away from the anterior vaginal wall to allow mesh placement. The peritoneum was then dissected away from the posterior vaginal wall and the rectovaginal space was then entered as the rectum was dissected away from the posterior vaginal wall.      The monopolar scissors and cadiere forcep were removed and the needle drivers inserted.  The [employer] Upsylon-Y polypropylene mesh was then introduced into the abdomen with the abdomen with the anterior leaf cut to 5 cm and a posterior leaf cut to 6 cm. The anterior mesh was then affixed to the anterior vaginal wall in an interrupted fashion using 2-0 PDS. Six interrupted sutures were placed.   An additional four corner sutures were placed using 2-0 Prolene. The posterior leaf of the mesh was then affixed to the posterior vaginal wall using 2-0 PDS sutures.  Eight interrupted sutures were placed.   An additional four corner sutures were placed using 2-0 Prolene.The sacral promontory was then again visualized and 2 sutures of 2-0 Prolene were then placed throught the anterior longitdunial ligament with the first suture approximately 1 cm distal to the promontory and the first suture placed a centimeter proximal to the first. The tail of the mesh was then brought up towards the sacral promontory and the mesh was adjusted in a way such as to support the vagina without placing undue tension on the vagina. Excess mesh was then trimmed and the 2-0 Prolene sutures were then placed through the mesh and the mesh was then tied down to the sacral promontory.  The peritoneum overlying the mesh was re-approximated in a running non-locking fashion using 2-0 Monocryl. Excellent hemostasis was noted.      Following this, the  Quinonez catheter was removed and cystourehtroscopy was then performed. There was noted to be efflux of urine from bilateral ureteral orficies and there was no evidence of injury to the bladder or urethra. The bladder was then drained a Quinonez catheter was then reinserted. The robot was then undocked. The assistant trocar was then removed and was then closed using a Eric-Bean device and a suture of #0 Vicryl suture. CO2 was released from the patient's abdomen. The patient was taken out of Trendenelburg position and given 5 large breaths to expel all remaining carbon dioxide gas from the abdomen.The remaining trocars were removed. All remaining skin incisions were closed in a subcuticular fashion using 4-0 Monocryl.  Steri-Strips and Tegaderm were placed over all incisions. Good hemostastis was noted from all trocar sites.    Next, attention was turned below to perform the retropubic midurethral sling. Exit points were marked at the pubic bone, 4 cm apart. Two Allis' were placed at 1 cm proximal to the urethral meatus and at the urethro-vesicular junction framing the mid-urethra. The suprapubic exit sites and both lateral sulci were injected with 60 ml (30 ml at each side of the exit points) of dilute epinephrine solution (0.635 mg in 500 ml Normal Saline) and a midline incision was made in the vaginal mucosa over the mid-urethra with a #15 blade scalpel. Bilateral tunnels were made under the mucosa toward the pubic rami until the pubic bone was reached.  The Advantage Fit sling and trocar for the patient's right was were then placed in the right vaginal dissection site and advanced underneath the pubic bone into the retropubic space and out the exit site above the pubic bone. The trocar was withdrawn. The trocar and sling were reloaded and the same was done on the left side.   The vaginal sulci were checked to ensure no injury to the vaginal mucosa. The quinonez catheter was removed and cystoscopy was performed that  revealed no evidence of mesh in the bladder and no evidence of trocar injury. The quinonez catheter was replaced. The urethra was inspected and no injury was noted. A long curved Demetria clamp was then placed between the vagina and the mesh at the midurethra and the exit point ends of the mesh were pulled up to abut the Demetria clamp. The blue tab was cut and mesh sheath was removed, tensioning the mesh against the Demetria with care to not overtension. The extra mesh was trimmed with sterile scissors at the pubic sites. The mesh over the urethra was seen to be laying flat and loose enough not to exert pressure on urethra at rest. The vaginal incision was copiously irrigated with sterile fluid and wound was found to be hemostatic. The vaginal incision was closed with 2-0 Vicryl (horizontal mattress directly over the mesh, and running-non-locking over the superficial epithelium).     Next, attention was turned to perform the modified posterior colporrhaphy and perineorrhaphy. 2 Allis clamps were placed at 5 and 7:00 at the introitus and an inverted triangle incision was made from the distal vagina to the perineal body. The overlying epithelium was dissected off using Metzenbaums Scissors. The levator ani muscles were reapproximated with a crown stitch using 0-Vicryl suture. The perineal body and bulbocavernosus were reinforced using #1 Vicryl. Superior vaginal epithelium was closed with 0 Vicryl in interrupted sutures. Finally a running 3-0 Vicryl suture was used to close the remaining vaginal epithelium similar to a second degree repair. Rectal exam was performed without evidence of injury.     The procedure was completed at this point. All needle, lap, and instrument counts were correct x 2. The patient tolerated the procedure well and was transferred to the PACU in stable condition with quinonez catheter in place.     Dr. hCavez was present and participated in all portions of the surgery.     Kaylah Chung MD, PGY-6    Attending: Dr. Cristopher Chavez MD   I was present and scrubbed through the entire case

## 2024-11-26 NOTE — ANESTHESIA PREPROCEDURE EVALUATION
Anesthesia Evaluation     Patient summary reviewed and Nursing notes reviewed   history of anesthetic complications:  PONV  NPO Solid Status: > 8 hours  NPO Liquid Status: > 2 hours           Airway   Mallampati: II  TM distance: >3 FB  Neck ROM: full  Dental      Pulmonary - negative pulmonary ROS   Cardiovascular     ECG reviewed    (+) valvular problems/murmurs murmur  (-) past MI, CAD, dysrhythmias, angina, CHF, cardiac stents, CABG      Neuro/Psych  (+) psychiatric history Anxiety  GI/Hepatic/Renal/Endo    (+) GERD well controlled    Musculoskeletal (-) negative ROS    Abdominal    Substance History - negative use     OB/GYN      Comment: Uterovaginal prolapse      Other   blood dyscrasia anemia,                   Anesthesia Plan    ASA 2     general     (Consider TIVA for PONV prevention)  intravenous induction     Anesthetic plan, risks, benefits, and alternatives have been provided, discussed and informed consent has been obtained with: patient.      CODE STATUS:

## 2024-11-27 VITALS
RESPIRATION RATE: 16 BRPM | DIASTOLIC BLOOD PRESSURE: 62 MMHG | TEMPERATURE: 98.1 F | HEART RATE: 59 BPM | SYSTOLIC BLOOD PRESSURE: 166 MMHG | OXYGEN SATURATION: 97 %

## 2024-11-27 PROCEDURE — A9270 NON-COVERED ITEM OR SERVICE: HCPCS | Performed by: STUDENT IN AN ORGANIZED HEALTH CARE EDUCATION/TRAINING PROGRAM

## 2024-11-27 PROCEDURE — 63710000001 BUSPIRONE 5 MG TABLET: Performed by: STUDENT IN AN ORGANIZED HEALTH CARE EDUCATION/TRAINING PROGRAM

## 2024-11-27 PROCEDURE — 63710000001 ACETAMINOPHEN EXTRA STRENGTH 500 MG TABLET: Performed by: STUDENT IN AN ORGANIZED HEALTH CARE EDUCATION/TRAINING PROGRAM

## 2024-11-27 PROCEDURE — 25010000002 ONDANSETRON PER 1 MG: Performed by: STUDENT IN AN ORGANIZED HEALTH CARE EDUCATION/TRAINING PROGRAM

## 2024-11-27 PROCEDURE — 25010000002 KETOROLAC TROMETHAMINE PER 15 MG: Performed by: STUDENT IN AN ORGANIZED HEALTH CARE EDUCATION/TRAINING PROGRAM

## 2024-11-27 PROCEDURE — 63710000001 PANTOPRAZOLE 40 MG TABLET DELAYED-RELEASE: Performed by: STUDENT IN AN ORGANIZED HEALTH CARE EDUCATION/TRAINING PROGRAM

## 2024-11-27 PROCEDURE — 63710000001 OXYCODONE 5 MG TABLET: Performed by: STUDENT IN AN ORGANIZED HEALTH CARE EDUCATION/TRAINING PROGRAM

## 2024-11-27 PROCEDURE — G0378 HOSPITAL OBSERVATION PER HR: HCPCS

## 2024-11-27 RX ADMIN — KETOROLAC TROMETHAMINE 15 MG: 15 INJECTION, SOLUTION INTRAMUSCULAR; INTRAVENOUS at 02:44

## 2024-11-27 RX ADMIN — KETOROLAC TROMETHAMINE 15 MG: 15 INJECTION, SOLUTION INTRAMUSCULAR; INTRAVENOUS at 08:51

## 2024-11-27 RX ADMIN — ONDANSETRON 4 MG: 2 INJECTION, SOLUTION INTRAMUSCULAR; INTRAVENOUS at 09:48

## 2024-11-27 RX ADMIN — PANTOPRAZOLE SODIUM 40 MG: 40 TABLET, DELAYED RELEASE ORAL at 05:02

## 2024-11-27 RX ADMIN — BUSPIRONE HYDROCHLORIDE 5 MG: 5 TABLET ORAL at 08:51

## 2024-11-27 RX ADMIN — OXYCODONE HYDROCHLORIDE 5 MG: 5 TABLET ORAL at 11:55

## 2024-11-27 RX ADMIN — ACETAMINOPHEN 1000 MG: 500 TABLET ORAL at 06:14

## 2024-11-27 NOTE — ANESTHESIA POSTPROCEDURE EVALUATION
Patient: Tracie Mcintyre    Procedure Summary       Date: 11/26/24 Room / Location: [facility] OR  / [facility] MAIN OR    Anesthesia Start: 1047 Anesthesia Stop: 1730    Procedures:       ROBOTIC ASST TOTAL LAPAROSCOPIC HYSTERECTOMY BILATERAL SALPING-OOPHORECTOMY SACRALCOLPOPEXY (Abdomen)      POSTERIOR COLPORRHOPHY, CYSTOCOPY, AND VAGINAL SLING (Vagina) Diagnosis:     Surgeons: Cristopher Chavez MD Provider: Sarah Phipps MD    Anesthesia Type: general ASA Status: 2            Anesthesia Type: general    Vitals  Vitals Value Taken Time   /96 11/26/24 1915   Temp 36.4 °C (97.5 °F) 11/26/24 1727   Pulse 85 11/26/24 1925   Resp 16 11/26/24 1915   SpO2 100 % 11/26/24 1925   Vitals shown include unfiled device data.        Post Anesthesia Care and Evaluation    Patient location during evaluation: bedside  Patient participation: complete - patient participated  Level of consciousness: sleepy but conscious  Pain score: 0  Pain management: adequate    Airway patency: patent  Anesthetic complications: No anesthetic complications    Cardiovascular status: acceptable  Respiratory status: acceptable  Hydration status: acceptable    Comments: /96   Pulse 78   Temp 36.4 °C (97.5 °F) (Oral)   Resp 16   SpO2 98%

## 2024-11-27 NOTE — NURSING NOTE
Pt discharged to home. IV's removed. Pt passed voiding trial. Prescription delivered to bedside. Went over discharge and follow-up instructions. Son providing transportation.

## 2024-11-27 NOTE — PLAN OF CARE
Goal Outcome Evaluation:  Plan of Care Reviewed With: patient        Progress: no change  Outcome Evaluation: Pt. confused upon arrival to floor, pt more oriented now, abdominal lap sites x5, x2 suprapubic sites, F/C in place w/ no orders to remove, pain well controlled w/ scheduled tylenol, family at bedside, no c/o of nausea, amb in l[place], tolerating sips of water and crackers, scds on, room air,possible d/c today

## 2024-11-27 NOTE — DISCHARGE SUMMARY
[facility]     Progress Note/Discharge Summary    Patient Name: Tracie Mcintyre  : 1955  MRN: [id number]  Primary Care Physician:  Lorenzo Hurtado PA-C  Date of admission: 2024    Subjective   Subjective     Chief Complaint: vaginal prolapse    History of Present Illness  Patient is POD 1 sp Robotic Hysterectomy, BSO, Sacral Colpopexy, Posterior Repair, Retropubic Sling & Cystoscopy.  She stayed over night as sh was too sleepy per nursing for discharge and lives 2 hours  away.  This morning she is doing well with no complaints    Post-op Follow-up  Pain Control:  Well controlled  Fever:  No fever  Diet:  Adequate intake  Activity:  Returning to normal    Patient Reports appropriate discomfort for surgery yesterday    Review of Systems   Respiratory: Negative.     Gastrointestinal:  Positive for abdominal pain.   Genitourinary:  Positive for difficulty urinating.   Neurological: Negative.    Abd pain appropriate pos surgery day 1    Objective   Objective     Vitals:   Temp:  [97.5 °F (36.4 °C)-99 °F (37.2 °C)] 98.4 °F (36.9 °C)  Heart Rate:  [61-80] 61  Resp:  [14-18] 16  BP: (129-177)/(53-96) 134/66  Flow (L/min) (Oxygen Therapy):  [4] 4    Physical Exam  Constitutional:       Appearance: Normal appearance.   Abdominal:      General: Abdomen is flat. Bowel sounds are normal. There is no distension.      Palpations: Abdomen is soft.      Tenderness: There is no abdominal tenderness.   Skin:     General: Skin is warm and dry.   Neurological:      Mental Status: She is alert and oriented to person, place, and time.          Result Review    Result Review:  I have personally reviewed the results from the time of this admission to 2024 07:36 EST and agree with these findings:  [x]  Laboratory list / accordion  []  Microbiology  []  Radiology  []  EKG/Telemetry   []  Cardiology/Vascular   []  Pathology  []  Old records  []  Other:  Most notable findings include: Potasium 3.4 but patient will return to  regular diet which should be adequate      Assessment & Plan   Assessment / Plan     Brief Patient Summary:  Tracie Mcintyre is a 69 y.o. female who is POD 1 sp Robotic Hys, BSO, Sacral colpopexy, Posterior colporrhaphy, Retropubic sling, cystoscopy doing well    Active Hospital Problems:  Active Hospital Problems    Diagnosis     **Postoperative pain      Plan:       VTE Prophylaxis:  Mechanical VTE prophylaxis orders are present.        CODE STATUS:    Code Status (Patient has no pulse and is not breathing): CPR (Attempt to Resuscitate)  Medical Interventions (Patient has pulse or is breathing): Full Support    Disposition:  I expect patient to be discharged today  Follow up with Scott in 1 week if quinonez, 6 weeks if no quinonez  Discharge medications Oxycodone otherwise continue home medications.    Cristopher Chavez MD

## 2024-11-27 NOTE — PROGRESS NOTES
[facility]     Progress Note/Discharge Summary    Patient Name: Tracie Mcintyre  : 1955  MRN: [id number]  Primary Care Physician:  Lorenzo Hurtado PA-C  Date of admission: 2024    Subjective   Subjective     Chief Complaint: vaginal prolapse    History of Present Illness  Patient is POD 1 sp Robotic Hysterectomy, BSO, Sacral Colpopexy, Posterior Repair, Retropubic Sling & Cystoscopy.  She stayed over night as sh was too sleepy per nursing for discharge and lives 2 hours  away.  This morning she is doing well with no complaints    Post-op Follow-up  Pain Control:  Well controlled  Fever:  No fever  Diet:  Adequate intake  Activity:  Returning to normal    Patient Reports appropriate discomfort for surgery yesterday    Review of Systems   Respiratory: Negative.     Gastrointestinal:  Positive for abdominal pain.   Genitourinary:  Positive for difficulty urinating.   Neurological: Negative.    Abd pain appropriate pos surgery day 1    Objective   Objective     Vitals:   Temp:  [97.5 °F (36.4 °C)-99 °F (37.2 °C)] 98.4 °F (36.9 °C)  Heart Rate:  [61-80] 61  Resp:  [14-18] 16  BP: (129-177)/(53-96) 134/66  Flow (L/min) (Oxygen Therapy):  [4] 4    Physical Exam  Constitutional:       Appearance: Normal appearance.   Abdominal:      General: Abdomen is flat. Bowel sounds are normal. There is no distension.      Palpations: Abdomen is soft.      Tenderness: There is no abdominal tenderness.   Skin:     General: Skin is warm and dry.   Neurological:      Mental Status: She is alert and oriented to person, place, and time.          Result Review    Result Review:  I have personally reviewed the results from the time of this admission to 2024 07:36 EST and agree with these findings:  [x]  Laboratory list / accordion  []  Microbiology  []  Radiology  []  EKG/Telemetry   []  Cardiology/Vascular   []  Pathology  []  Old records  []  Other:  Most notable findings include: Potasium 3.4 but patient will return to  regular diet which should be adequate      Assessment & Plan   Assessment / Plan     Brief Patient Summary:  Tracie Mcintyre is a 69 y.o. female who is POD 1 sp Robotic Hys, BSO, Sacral colpopexy, Posterior colporrhaphy, Retropubic sling, cystoscopy doing well    Active Hospital Problems:  Active Hospital Problems    Diagnosis     **Postoperative pain      Plan:       VTE Prophylaxis:  Mechanical VTE prophylaxis orders are present.        CODE STATUS:    Code Status (Patient has no pulse and is not breathing): CPR (Attempt to Resuscitate)  Medical Interventions (Patient has pulse or is breathing): Full Support    Disposition:  I expect patient to be discharged today  Follow up with Scott in 1 week if quinonez, 6 weeks if no quinonez  Discharge medications Oxycodone otherwise continue home medications.    Cristopher Chavez MD

## 2024-11-27 NOTE — PROGRESS NOTES
[facility]     Progress Note/Discharge Summary    Patient Name: Tracie Mcintyre  : 1955  MRN: [id number]  Primary Care Physician:  Lorenzo Hurtado PA-C  Date of admission: 2024    Subjective   Subjective     Chief Complaint: vaginal prolapse    History of Present Illness  Patient is POD 1 sp Robotic Hysterectomy, BSO, Sacral Colpopexy, Posterior Repair, Retropubic Sling & Cystoscopy.  She stayed over night as sh was too sleepy per nursing for discharge and lives 2 hours  away.  This morning she is doing well with no complaints    Post-op Follow-up  Pain Control:  Well controlled  Fever:  No fever  Diet:  Adequate intake  Activity:  Returning to normal    Patient Reports appropriate discomfort for surgery yesterday    Review of Systems   Respiratory: Negative.     Gastrointestinal:  Positive for abdominal pain.   Genitourinary:  Positive for difficulty urinating.   Neurological: Negative.    Abd pain appropriate pos surgery day 1    Objective   Objective     Vitals:   Temp:  [97.5 °F (36.4 °C)-99 °F (37.2 °C)] 98.4 °F (36.9 °C)  Heart Rate:  [61-80] 61  Resp:  [14-18] 16  BP: (129-177)/(53-96) 134/66  Flow (L/min) (Oxygen Therapy):  [4] 4    Physical Exam  Constitutional:       Appearance: Normal appearance.   Abdominal:      General: Abdomen is flat. Bowel sounds are normal. There is no distension.      Palpations: Abdomen is soft.      Tenderness: There is no abdominal tenderness.   Skin:     General: Skin is warm and dry.   Neurological:      Mental Status: She is alert and oriented to person, place, and time.          Result Review    Result Review:  I have personally reviewed the results from the time of this admission to 2024 07:25 EST and agree with these findings:  [x]  Laboratory list / accordion  []  Microbiology  []  Radiology  []  EKG/Telemetry   []  Cardiology/Vascular   []  Pathology  []  Old records  []  Other:  Most notable findings include: Potasium 3.4 but patient will return to  regular diet which should be adequate      Assessment & Plan   Assessment / Plan     Brief Patient Summary:  Tracie Mcintyre is a 69 y.o. female who is POD 1 sp Robotic Hys, BSO, Sacral colpopexy, Posterior colporrhaphy, Retropubic sling, cystoscopy doing well    Active Hospital Problems:  Active Hospital Problems    Diagnosis     **Postoperative pain      Plan:       VTE Prophylaxis:  Mechanical VTE prophylaxis orders are present.        CODE STATUS:    Code Status (Patient has no pulse and is not breathing): CPR (Attempt to Resuscitate)  Medical Interventions (Patient has pulse or is breathing): Full Support    Disposition:  I expect patient to be discharged today  Follow up with Scott in 1 week if quinonez, 6 weeks if no quinonez  Discharge medications Oxycodone otherwise continue home medications.    Cristopher Chavez MD

## 2024-11-27 NOTE — CASE MANAGEMENT/SOCIAL WORK
Case Management Discharge Note      Final Note: DC'd home. Kole RIVERA RN         Selected Continued Care - Discharged on 11/27/2024 Admission date: 11/26/2024 - Discharge disposition: Home or Self Care      Destination    No services have been selected for the patient.                Durable Medical Equipment    No services have been selected for the patient.                Dialysis/Infusion    No services have been selected for the patient.                Home Medical Care    No services have been selected for the patient.                Therapy    No services have been selected for the patient.                Community Resources    No services have been selected for the patient.                Community & DME    No services have been selected for the patient.                    Transportation Services  Private: Car    Final Discharge Disposition Code: 01 - home or self-care

## 2024-12-02 LAB
CYTO UR: NORMAL
LAB AP CASE REPORT: NORMAL
PATH REPORT.FINAL DX SPEC: NORMAL
PATH REPORT.GROSS SPEC: NORMAL

## (undated) DEVICE — SUT MNCRYL 2/0 SH 27IN Y317H

## (undated) DEVICE — SUT MNCRYL PLS ANTIB UD 4/0 PS2 18IN

## (undated) DEVICE — MANIP UTER RUMI TP 6.7MMX8CM BLU

## (undated) DEVICE — ANTIBACTERIAL UNDYED BRAIDED (POLYGLACTIN 910), SYNTHETIC ABSORBABLE SUTURE: Brand: COATED VICRYL

## (undated) DEVICE — LOU LITHOTOMY ROBOTIC: Brand: MEDLINE INDUSTRIES, INC.

## (undated) DEVICE — MANIP UTER RUMI 2 KOH EFFICIENT 3CM BL

## (undated) DEVICE — DRAPE,REIN 53X77,STERILE: Brand: MEDLINE

## (undated) DEVICE — ST IRR CYSTO W/SPK 77IN LF

## (undated) DEVICE — STRIP,CLOSURE,WOUND,MEDI-STRIP,1/2X4: Brand: MEDLINE

## (undated) DEVICE — ENDOPATH XCEL BLUNT TIP TROCARS WITH SMOOTH SLEEVES: Brand: ENDOPATH XCEL

## (undated) DEVICE — ADHS LIQ MASTISOL 2/3ML

## (undated) DEVICE — 60 ML SYRINGE LUER-LOCK TIP: Brand: MONOJECT

## (undated) DEVICE — COUNT NDL MAG FM/BLCK 40COUNT

## (undated) DEVICE — SYR LUERLOK 30CC

## (undated) DEVICE — THE STERILE LIGHT HANDLE COVER IS USED WITH STERIS SURGICAL LIGHTING AND VISUALIZATION SYSTEMS.

## (undated) DEVICE — 3M™ STERI-DRAPE™ INSTRUMENT POUCH 1018L: Brand: STERI-DRAPE™

## (undated) DEVICE — BLADELESS OBTURATOR: Brand: WECK VISTA

## (undated) DEVICE — SOL NACL 0.9PCT 1000ML

## (undated) DEVICE — SOL ANTISTICK CAUTRY ELECTROLUBE LF

## (undated) DEVICE — TROC BLADLES ANCHORPORT/OPTI LP 12X120MM 1P/U

## (undated) DEVICE — ENDOPATH PNEUMONEEDLE INSUFFLATION NEEDLES WITH LUER LOCK CONNECTORS 120MM: Brand: ENDOPATH

## (undated) DEVICE — SUT PDS 2/0 CT1 27IN DYED Z339H

## (undated) DEVICE — ST TBG AIRSEAL FLTR TRI LUM

## (undated) DEVICE — DRAPE,UNDERBUTTOCKS,PCH,STERILE: Brand: MEDLINE

## (undated) DEVICE — BNDG ADHS PLSTC 1X3IN LF

## (undated) DEVICE — DEV SUT GRSPR CLOSUR 15CM 14G

## (undated) DEVICE — NEEDLE, QUINCKE, 18GX3.5": Brand: MEDLINE

## (undated) DEVICE — SUT VIC 0 CT1 27IN DYED J340H

## (undated) DEVICE — SOL NS 500ML

## (undated) DEVICE — SYR LUERLOK 5CC

## (undated) DEVICE — VIOLET BRAIDED (POLYGLACTIN 910), SYNTHETIC ABSORBABLE SUTURE: Brand: COATED VICRYL

## (undated) DEVICE — SUT VIC 0 TIES 18IN J912G

## (undated) DEVICE — GLV SURG PREMIERPRO ORTHO LTX PF SZ8 BRN

## (undated) DEVICE — TIP COVER ACCESSORY

## (undated) DEVICE — VESSEL SEALER EXTEND: Brand: ENDOWRIST

## (undated) DEVICE — SUT PROLN 2/0 SH 36IN 8523H

## (undated) DEVICE — DECANT BG O JET

## (undated) DEVICE — SUT VIC 0 UR6 27IN VCP603H

## (undated) DEVICE — SYRINGE, LUER LOCK, 60ML: Brand: MEDLINE

## (undated) DEVICE — IRRIGATOR BULB ASEPTO 60CC STRL

## (undated) DEVICE — COLUMN DRAPE

## (undated) DEVICE — SUT PDS 2/0 SH 27IN Z317H

## (undated) DEVICE — SUT PDS 0 CT1 36IN Z346H

## (undated) DEVICE — ARM DRAPE

## (undated) DEVICE — INTENDED FOR TISSUE SEPARATION, AND OTHER PROCEDURES THAT REQUIRE A SHARP SURGICAL BLADE TO PUNCTURE OR CUT.: Brand: BARD-PARKER ® CARBON RIB-BACK BLADES

## (undated) DEVICE — SYR LL TP 10ML STRL

## (undated) DEVICE — COVER,MAYO STAND,STERILE: Brand: MEDLINE

## (undated) DEVICE — SEAL

## (undated) DEVICE — DRSNG WND BORDR/ADHS NONADHR/GZ LF 2X2IN STRL